# Patient Record
Sex: FEMALE | Race: WHITE | NOT HISPANIC OR LATINO | Employment: FULL TIME | URBAN - METROPOLITAN AREA
[De-identification: names, ages, dates, MRNs, and addresses within clinical notes are randomized per-mention and may not be internally consistent; named-entity substitution may affect disease eponyms.]

---

## 2024-02-07 ENCOUNTER — TELEPHONE (OUTPATIENT)
Dept: PRIMARY CARE | Facility: CLINIC | Age: 69
End: 2024-02-07
Payer: MEDICARE

## 2024-02-07 NOTE — TELEPHONE ENCOUNTER
Patient is an old new and is interested in coming back again to establish care/neck issues.  Is it okay to keep her on the schedule for tomorrow she has an appointment at 2:30 with you?  Patient can be reached at [phone]      Thank You

## 2024-02-08 ENCOUNTER — APPOINTMENT (OUTPATIENT)
Dept: PRIMARY CARE | Facility: CLINIC | Age: 69
End: 2024-02-08
Payer: MEDICARE

## 2024-02-09 ENCOUNTER — HOSPITAL ENCOUNTER (OUTPATIENT)
Dept: RADIOLOGY | Facility: CLINIC | Age: 69
Discharge: HOME | End: 2024-02-09
Payer: MEDICARE

## 2024-02-09 ENCOUNTER — OFFICE VISIT (OUTPATIENT)
Dept: PRIMARY CARE | Facility: CLINIC | Age: 69
End: 2024-02-09
Payer: MEDICARE

## 2024-02-09 VITALS
SYSTOLIC BLOOD PRESSURE: 125 MMHG | TEMPERATURE: 97.7 F | HEIGHT: 63 IN | RESPIRATION RATE: 16 BRPM | WEIGHT: 146 LBS | BODY MASS INDEX: 25.87 KG/M2 | DIASTOLIC BLOOD PRESSURE: 79 MMHG | HEART RATE: 89 BPM | OXYGEN SATURATION: 97 %

## 2024-02-09 DIAGNOSIS — M54.2 NECK PAIN: Primary | ICD-10-CM

## 2024-02-09 DIAGNOSIS — M54.2 NECK PAIN: ICD-10-CM

## 2024-02-09 PROCEDURE — 1125F AMNT PAIN NOTED PAIN PRSNT: CPT | Performed by: FAMILY MEDICINE

## 2024-02-09 PROCEDURE — 1160F RVW MEDS BY RX/DR IN RCRD: CPT | Performed by: FAMILY MEDICINE

## 2024-02-09 PROCEDURE — 1159F MED LIST DOCD IN RCRD: CPT | Performed by: FAMILY MEDICINE

## 2024-02-09 PROCEDURE — 72040 X-RAY EXAM NECK SPINE 2-3 VW: CPT | Performed by: RADIOLOGY

## 2024-02-09 PROCEDURE — 72040 X-RAY EXAM NECK SPINE 2-3 VW: CPT

## 2024-02-09 PROCEDURE — 1036F TOBACCO NON-USER: CPT | Performed by: FAMILY MEDICINE

## 2024-02-09 PROCEDURE — 99204 OFFICE O/P NEW MOD 45 MIN: CPT | Performed by: FAMILY MEDICINE

## 2024-02-09 RX ORDER — ZOLPIDEM TARTRATE 10 MG/1
TABLET ORAL
COMMUNITY

## 2024-02-09 RX ORDER — AMITRIPTYLINE HYDROCHLORIDE 25 MG/1
50 TABLET, FILM COATED ORAL NIGHTLY PRN
COMMUNITY
Start: 2015-10-01

## 2024-02-09 RX ORDER — BERBERINE CHLOR/SEAWEED/CHROM 500-250 MG
CAPSULE ORAL
COMMUNITY

## 2024-02-09 RX ORDER — IBUPROFEN 100 MG/5ML
SUSPENSION, ORAL (FINAL DOSE FORM) ORAL
COMMUNITY

## 2024-02-09 RX ORDER — PREDNISONE 10 MG/1
TABLET ORAL
Qty: 30 TABLET | Refills: 0 | Status: SHIPPED | OUTPATIENT
Start: 2024-02-09

## 2024-02-09 RX ORDER — CHOLECALCIFEROL (VITAMIN D3) 50 MCG
50 TABLET ORAL
COMMUNITY
Start: 2022-07-19

## 2024-02-09 RX ORDER — MELATONIN 3 MG
CAPSULE ORAL
COMMUNITY

## 2024-02-09 ASSESSMENT — ENCOUNTER SYMPTOMS
LOSS OF SENSATION IN FEET: 0
DEPRESSION: 0
OCCASIONAL FEELINGS OF UNSTEADINESS: 0

## 2024-02-09 NOTE — PROGRESS NOTES
"Subjective   Patient ID: Norma Gabriel is a 68 y.o. female who presents for Establish Care (Old/new pt. Had last wellness in Nov. She used to get adjustments on her neck and has not needed them in year but her neck has been bothering her the last 2 weeks.  ).    HPI   Patient comes in today complaining of neck pain.  She has had the symptoms for a couple weeks now.  She states she has switched to a very flat pillow in attempt to get rid of the neck pain but so far it has not helped.    2017  Patient comes in today complaining of urgency and burning with urination since yesterday. She is headed to [place] for the holidays. She believes she has a urinary tract infection. She started taking over-the-counter medicines yesterday for it. It is feeling a little better today.     2017  Patient comes in today complaining of right shoulder pain. It has been going on since Saturday. She was weed whacking on Saturday and afterward is when it started hurting. She contacted her insurance doctor on call and he told her to continue to exercise it because she probably has \"bursitis\". He told her to do \"[employer] therapy\". She comes in today because it is not feeling any better. She has pain along the deltoid cap on the right and down the right lateral arm. She is right-hand dominant. She has pain with abduction, adduction, internal and external rotation.    2017  Patient comes in today for evaluation of her right knee. She recently had surgery on her right foot by Dr. Francois, podiatry. Last Tuesday her father passed away and that was his  day. Doing the proceding she twisted her right knee. She has known arthritis in both knees. She describes the pain in the medial side of the right knee. There is no visible sign of ecchymosis or swelling.     Review of Systems   All other systems reviewed and are negative.      Objective   /79   Pulse 89   Temp 36.5 °C (97.7 °F)   Resp 16   Ht 1.6 m (5' 3\")   " Wt 66.2 kg (146 lb)   LMP  (LMP Unknown)   SpO2 97%   BMI 25.86 kg/m²     Physical Exam  Vitals reviewed.   Constitutional:       Appearance: She is well-developed.   HENT:      Head: Normocephalic and atraumatic.      Right Ear: Tympanic membrane, ear canal and external ear normal.      Left Ear: Tympanic membrane, ear canal and external ear normal.      Nose: Nose normal.      Mouth/Throat:      Mouth: Mucous membranes are moist.      Pharynx: Oropharynx is clear.   Eyes:      Extraocular Movements: Extraocular movements intact.      Conjunctiva/sclera: Conjunctivae normal.      Pupils: Pupils are equal, round, and reactive to light.   Cardiovascular:      Rate and Rhythm: Normal rate and regular rhythm.      Heart sounds: Normal heart sounds. No murmur heard.  Pulmonary:      Effort: Pulmonary effort is normal.      Breath sounds: Normal breath sounds. No wheezing.   Abdominal:      General: Abdomen is flat. Bowel sounds are normal.      Palpations: Abdomen is soft.   Musculoskeletal:         General: Normal range of motion.      Cervical back: Rigidity and tenderness (Decreased range of motion especially in left sidebending and rotational movement.) present.   Skin:     General: Skin is warm and dry.   Neurological:      General: No focal deficit present.      Mental Status: She is alert and oriented to person, place, and time. Mental status is at baseline.   Psychiatric:         Mood and Affect: Mood normal.         Behavior: Behavior normal.         Thought Content: Thought content normal.         Judgment: Judgment normal.       Assessment/Plan   Problem List Items Addressed This Visit    None  Visit Diagnoses         Codes    Neck pain    -  Primary M54.2    Relevant Medications    predniSONE (Deltasone) 10 mg tablet    Other Relevant Orders    XR cervical spine 2-3 views        Will contact patient with x-ray results when they are available.  Take medication as directed.  Continue current meds as  directed.  Follow up in 2 weeks for recheck if all remains stable, sooner if problems arise.  Medication list reconciled.  Thank you for visiting today!      Professional services: Some of this note was completed using Dragon voice technology and sometimes the software misinterprets words. This may include unintended errors with respect to translation of words, typographical errors or grammar errors which may not have been identified prior to finalization of the chart note. Please take this into account when reading the note. Thank you.

## 2024-02-13 ENCOUNTER — TELEPHONE (OUTPATIENT)
Dept: PRIMARY CARE | Facility: CLINIC | Age: 69
End: 2024-02-13
Payer: MEDICARE

## 2024-02-13 NOTE — TELEPHONE ENCOUNTER
Patient is calling in today to go over the results of her xray with the doctor.  Patient is also asking if she can cut the prednisone dose in half?  Patient states that is is helping her neck, but she is having a difficult time sleeping at night because it is keeping her awake.  Patient can be reached at [phone].      Thank You

## 2024-02-13 NOTE — TELEPHONE ENCOUNTER
Pt was given results and verbalized understanding.   She asks if PCP is still able to do adjustment on her neck/back? Please advise.

## 2024-02-13 NOTE — TELEPHONE ENCOUNTER
I can do manipulation but it will be more limited due to the arthritis in her neck.  It also should not be done until after she completes the 10-day course of prednisone.

## 2024-02-19 ENCOUNTER — APPOINTMENT (OUTPATIENT)
Dept: PRIMARY CARE | Facility: CLINIC | Age: 69
End: 2024-02-19
Payer: MEDICARE

## 2024-05-20 ENCOUNTER — PATIENT OUTREACH (OUTPATIENT)
Dept: CARE COORDINATION | Facility: CLINIC | Age: 69
End: 2024-05-20
Payer: MEDICARE

## 2024-05-20 NOTE — PROGRESS NOTES
Discharge Facility: [facility]  Discharge Diagnosis: Cellulitis  Admission Date: 5/18/2024  Discharge Date: 5/19/2024    PCP Appointment Date:  -Not scheduled d/t no contact; task sent to office to assist    Hospital Encounter and Summary: Linked     Unable to reach patient x2 attempts, voicemail left with call back number.

## 2024-06-03 ENCOUNTER — PATIENT OUTREACH (OUTPATIENT)
Dept: CARE COORDINATION | Facility: CLINIC | Age: 69
End: 2024-06-03
Payer: MEDICARE

## 2024-06-03 NOTE — PROGRESS NOTES
14 day post hospital discharge call back complete.  At time of outreach call the patient feels as if their condition has improved since last visit.  Pt has not seen PCP at this time. She states she did complete course of antibiotics and believes everything is headed in the right direction.  Pt denies any current needs from PCP.  Pt denies any questions, needs, or concerns at this time. She is encouraged to call if questions or needs arise.